# Patient Record
Sex: MALE | Race: WHITE | Employment: FULL TIME | URBAN - NONMETROPOLITAN AREA
[De-identification: names, ages, dates, MRNs, and addresses within clinical notes are randomized per-mention and may not be internally consistent; named-entity substitution may affect disease eponyms.]

---
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
## 2018-03-08 ENCOUNTER — DOCUMENTATION ONLY (OUTPATIENT)
Dept: FAMILY MEDICINE | Facility: OTHER | Age: 18
End: 2018-03-08

## 2018-03-08 RX ORDER — DIPHENOXYLATE HYDROCHLORIDE AND ATROPINE SULFATE 2.5; .025 MG/1; MG/1
1 TABLET ORAL DAILY
COMMUNITY
Start: 2014-09-09

## 2018-03-25 ENCOUNTER — HEALTH MAINTENANCE LETTER (OUTPATIENT)
Age: 18
End: 2018-03-25

## 2018-06-16 ENCOUNTER — HOSPITAL ENCOUNTER (OUTPATIENT)
Dept: GENERAL RADIOLOGY | Facility: OTHER | Age: 18
Discharge: HOME | End: 2018-06-16
Attending: PHYSICIAN ASSISTANT | Admitting: PHYSICIAN ASSISTANT
Payer: COMMERCIAL

## 2018-06-16 ENCOUNTER — OFFICE VISIT (OUTPATIENT)
Dept: FAMILY MEDICINE | Facility: OTHER | Age: 18
End: 2018-06-16
Attending: PHYSICIAN ASSISTANT
Payer: COMMERCIAL

## 2018-06-16 VITALS
DIASTOLIC BLOOD PRESSURE: 82 MMHG | HEART RATE: 69 BPM | BODY MASS INDEX: 23.43 KG/M2 | OXYGEN SATURATION: 98 % | WEIGHT: 173 LBS | SYSTOLIC BLOOD PRESSURE: 102 MMHG | HEIGHT: 72 IN

## 2018-06-16 DIAGNOSIS — S69.92XA THUMB INJURY, LEFT, INITIAL ENCOUNTER: ICD-10-CM

## 2018-06-16 DIAGNOSIS — S69.92XA THUMB INJURY, LEFT, INITIAL ENCOUNTER: Primary | ICD-10-CM

## 2018-06-16 PROCEDURE — 99213 OFFICE O/P EST LOW 20 MIN: CPT | Performed by: PHYSICIAN ASSISTANT

## 2018-06-16 PROCEDURE — 73140 X-RAY EXAM OF FINGER(S): CPT | Mod: LT

## 2018-06-16 NOTE — PATIENT INSTRUCTIONS
Home care  Follow these guidelines when caring for yourself at home:    Keep your arm elevated to reduce pain and swelling. When sitting or lying down elevate your arm above the level of your heart. You can do this by placing your arm on a pillow that rests on your chest or on a pillow at your side. This is most important during the first 2 days (48 hours) after the injury.    Put an ice pack on the injured area. Do this for 20 minutes every 1 to 2 hours the first day for pain relief. You can make an ice pack by wrapping a plastic bag of ice cubes in a thin towel. As the ice melts, be careful that the cast or splint doesn t get wet. Continue using the ice pack 3 to 4 times a day for the next 2 days. Then use the ice pack as needed to ease pain and swelling.    You may use acetaminophen or ibuprofen to control pain, unless another pain medicine was prescribed. If you have chronic liver or kidney disease, talk with your healthcare provider before using these medicines. Also talk with your provider if you ve had a stomach ulcer or gastrointestinal bleeding.  When to seek medical advice    Pain or swelling gets worse    Redness or warmth in the hand    Fingers or hand become cold, blue, numb, or tingly    Fever of 100.4 F (38 C) or higher, or as directed by your healthcare provider

## 2018-06-16 NOTE — NURSING NOTE
Patient present to clinic today with a injury to his left thumb. He smashed it with a hammer last Harsh 6/10/18.  Dyana Henson CMA..............6/16/2018........1:19 PM

## 2018-06-16 NOTE — MR AVS SNAPSHOT
After Visit Summary   6/16/2018    Agusto Beach    MRN: [id number]           Patient Information     Date Of Birth          2000        Visit Information        Provider Department      6/16/2018 12:45 PM Christy Bird PA-C [facility]        Today's Diagnoses     Thumb injury, left, initial encounter    -  1      Care Instructions      Home care  Follow these guidelines when caring for yourself at home:    Keep your arm elevated to reduce pain and swelling. When sitting or lying down elevate your arm above the level of your heart. You can do this by placing your arm on a pillow that rests on your chest or on a pillow at your side. This is most important during the first 2 days (48 hours) after the injury.    Put an ice pack on the injured area. Do this for 20 minutes every 1 to 2 hours the first day for pain relief. You can make an ice pack by wrapping a plastic bag of ice cubes in a thin towel. As the ice melts, be careful that the cast or splint doesn t get wet. Continue using the ice pack 3 to 4 times a day for the next 2 days. Then use the ice pack as needed to ease pain and swelling.    You may use acetaminophen or ibuprofen to control pain, unless another pain medicine was prescribed. If you have chronic liver or kidney disease, talk with your healthcare provider before using these medicines. Also talk with your provider if you ve had a stomach ulcer or gastrointestinal bleeding.  When to seek medical advice    Pain or swelling gets worse    Redness or warmth in the hand    Fingers or hand become cold, blue, numb, or tingly    Fever of 100.4 F (38 C) or higher, or as directed by your healthcare provider            Follow-ups after your visit        Future tests that were ordered for you today     Open Future Orders        Priority Expected Expires Ordered    XR Finger Left G/E 2 Views Routine 6/16/2018 6/16/2019 6/16/2018            Who to contact     If you have  "questions or need follow up information about today's clinic visit or your schedule please contact [facility] directly at [phone].  Normal or non-critical lab and imaging results will be communicated to you by Stroh[facility]art, letter or phone within 4 business days after the clinic has received the results. If you do not hear from us within 7 days, please contact the clinic through Strohohart or phone. If you have a critical or abnormal lab result, we will notify you by phone as soon as possible.  Submit refill requests through SendGrid or call your pharmacy and they will forward the refill request to us. Please allow 3 business days for your refill to be completed.          Additional Information About Your Visit        [facility] Information     SendGrid lets you send messages to your doctor, view your test results, renew your prescriptions, schedule appointments and more. To sign up, go to [email or web address] . Click on \"Log in\" on the left side of the screen, which will take you to the Welcome page. Then click on \"Sign up Now\" on the right side of the page.     You will be asked to enter the access code listed below, as well as some personal information. Please follow the directions to create your username and password.     Your access code is: [email or web address]  Expires: 2018  2:07 PM     Your access code will  in 90 days. If you need help or a new code, please call your [facility] clinic or [phone].        Care EveryWhere ID     This is your Care EveryWhere ID. This could be used by other organizations to access your [facility] medical records  [id number]        Your Vitals Were     Pulse Height Pulse Oximetry BMI (Body Mass Index)          69 6' (1.829 m) 98% 23.46 kg/m2         Blood Pressure from Last 3 Encounters:   18 102/82   11/10/15 90/64   06/25/15 116/74    Weight from Last 3 Encounters:   18 173 lb (78.5 kg) (75 %)*   11/10/15 151 lb 3.2 oz (68.6 kg) (63 %)* "   06/25/15 150 lb 12.8 oz (68.4 kg) (67 %)*     * Growth percentiles are based on CDC 2-20 Years data.               Primary Care Provider Office Phone # Fax #    Prosper Fine [phone]       [address]  [place] [address]        Equal Access to Services     [facility] HospitalANGÉLICA : Hadii aad ku hadasho Soomaali, waaxda luqadaha, qaybta kaalmada adeegyada, waxay louisain hayaan ademaryann khjacquelinerob lacecily . So [facility] [phone].    ATENCIÓN: Si habla español, tiene a moser disposición servicios gratuitos de asistencia lingüística. Llame al [phone].    We comply with applicable federal civil rights laws and [place] laws. We do not discriminate on the basis of race, color, national origin, age, disability, sex, sexual orientation, or gender identity.            Thank you!     Thank you for choosing [facility]  for your care. Our goal is always to provide you with excellent care. Hearing back from our patients is one way we can continue to improve our services. Please take a few minutes to complete the written survey that you may receive in the mail after your visit with us. Thank you!             Your Updated Medication List - Protect others around you: Learn how to safely use, store and throw away your medicines at www.disposemymeds.org.          This list is accurate as of 6/16/18  2:07 PM.  Always use your most recent med list.                   Brand Name Dispense Instructions for use Diagnosis    MULTI-VITAMINS Tabs      Take 1 tablet by mouth daily

## 2018-06-17 NOTE — PROGRESS NOTES
SUBJECTIVE:   Agusto Beach is a 17 year old male presenting with a chief complaint of   Chief Complaint   Patient presents with     Thumb Discomfort     injury - left     Nursing Notes:   Dyana Henson CMA  6/16/2018  2:05 PM  Signed  Patient present to clinic today with a injury to his left thumb. He smashed it with a hammer last Harsh 6/10/18.  Dyana Henson CMA..............6/16/2018........1:19 PM    HPI:  Agusto is presenting today with left thumb pain.  He reports smashing his left thumb with a hammer 6 days ago.  He had immediate swelling and bruising, worse for the first couple of days.  He was not seen for this problem at the time.  However, as the days goes go on and he still having difficulty moving his thumb at the IP joint, he decides to come in today for an x-ray.  He reports the swelling and bruising have improved.  He has been wearing a makeshift splint and that is helpful for pain.  He also has been using ibuprofen and ice as needed.  He has small amount of bruising to the thumbnail.    Review of Systems  He is otherwise feeling fine.  Denies any fever chills, cold symptoms, cough, chest pain, shortness of breath, abdominal pain, nausea, vomiting, diarrhea.  Past Medical History:   Diagnosis Date     Condition influencing health status     No Comments Provided   He has no chronic medical problems.  History reviewed. No pertinent family history.  Current Outpatient Prescriptions   Medication Sig Dispense Refill     Multiple Vitamin (MULTI-VITAMINS) TABS Take 1 tablet by mouth daily       Social History   Substance Use Topics     Smoking status: Never Smoker     Smokeless tobacco: Never Used     Alcohol use No   He is not a smoker.  He is in the [employer].    OBJECTIVE  /82  Pulse 69  Ht 6' (1.829 m)  Wt 173 lb (78.5 kg)  SpO2 98%  BMI 23.46 kg/m2    Physical Exam  Alert pleasant male in no acute distress.  Heart regular rate and rhythm without murmur.  Lungs clear to auscultation.   Left thumb with mild swelling at the IP joint, resolving ecchymosis.  He has a small 2 mm area of ecchymosis at the medial proximal nail bed.  Good capillary refill.  He has normal range of motion at the IP joint, pain with palpation here, especially medially.  Normal radial pulse.  Labs:  No results found for this or any previous visit (from the past 24 hour(s)).  X-ray left thumb with soft tissue swelling, otherwise normal      ASSESSMENT:      ICD-10-CM    1. Thumb injury, left, initial encounter S69.92XA XR Finger Left G/E 2 Views        PLAN:  Thumb contusion from the hammer injury.  Offered formal splinting, patient declines and will continue to use his homemade splint, which is quite adequate.  He is to use ice and ibuprofen as needed.  This should gradually improve over time.  If not, he will be reevaluated.    Christy Bird PA-C on 6/17/2018 at 4:41 PM          Patient Instructions     Home care  Follow these guidelines when caring for yourself at home:    Keep your arm elevated to reduce pain and swelling. When sitting or lying down elevate your arm above the level of your heart. You can do this by placing your arm on a pillow that rests on your chest or on a pillow at your side. This is most important during the first 2 days (48 hours) after the injury.    Put an ice pack on the injured area. Do this for 20 minutes every 1 to 2 hours the first day for pain relief. You can make an ice pack by wrapping a plastic bag of ice cubes in a thin towel. As the ice melts, be careful that the cast or splint doesn t get wet. Continue using the ice pack 3 to 4 times a day for the next 2 days. Then use the ice pack as needed to ease pain and swelling.    You may use acetaminophen or ibuprofen to control pain, unless another pain medicine was prescribed. If you have chronic liver or kidney disease, talk with your healthcare provider before using these medicines. Also talk with your provider if you ve had a stomach  ulcer or gastrointestinal bleeding.  When to seek medical advice    Pain or swelling gets worse    Redness or warmth in the hand    Fingers or hand become cold, blue, numb, or tingly    Fever of 100.4 F (38 C) or higher, or as directed by your healthcare provider

## 2019-05-22 ENCOUNTER — OFFICE VISIT (OUTPATIENT)
Dept: FAMILY MEDICINE | Facility: OTHER | Age: 19
End: 2019-05-22
Attending: PHYSICIAN ASSISTANT
Payer: COMMERCIAL

## 2019-05-22 VITALS
BODY MASS INDEX: 22.63 KG/M2 | TEMPERATURE: 98.9 F | HEART RATE: 72 BPM | WEIGHT: 167.1 LBS | HEIGHT: 72 IN | DIASTOLIC BLOOD PRESSURE: 72 MMHG | SYSTOLIC BLOOD PRESSURE: 116 MMHG | RESPIRATION RATE: 18 BRPM

## 2019-05-22 DIAGNOSIS — H65.90 FLUID COLLECTION OF MIDDLE EAR: Primary | ICD-10-CM

## 2019-05-22 PROCEDURE — 99213 OFFICE O/P EST LOW 20 MIN: CPT | Performed by: PHYSICIAN ASSISTANT

## 2019-05-22 ASSESSMENT — MIFFLIN-ST. JEOR: SCORE: 1805.96

## 2019-05-22 ASSESSMENT — PAIN SCALES - GENERAL: PAINLEVEL: MILD PAIN (3)

## 2019-05-22 NOTE — PATIENT INSTRUCTIONS
Ear ache with out infection see AVS  Ibuprofen or tylenol as needed  OTC daily antihistamine recommended  OTC Flonase, take as directed  Return to clinic for persistence or worsening   Seek immediate care ofr    Your child has a fever (see Fever and children, below)    Ear pain that gets worse    Discharge, blood, or foul odor from ear    Unusual decreased activity, fussiness, drowsiness, or confusion    Headache, neck pain, or stiff neck    New rash    Frequent diarrhea or vomiting    Fluid or blood draining from the ear    Convulsion (seizure)     Patient Education     Earache, No Infection (Adult)  Earaches can happen without an infection. This occurs when air and fluid build up behind the eardrum causing a feeling of fullness and discomfort and reduced hearing. This is called otitis media with effusion (OME) or serous otitis media. It means there is fluid in the middle ear. It is not the same as acute otitis media, which is typically from infection.  OME can happen when you have a cold if congestion blocks the passage that drains the middle ear. This passage is called the eustachian tube. OME may also occur with nasal allergies or after a bacterial middle ear infection.    The pain or discomfort may come and go. You may hear clicking or popping sounds when you chew or swallow. You may feel that your balance is off. Or you may hear ringing in the ear.  It often takes from several weeks up to 3 months for the fluid to clear on its own. Oral pain relievers and ear drops help if there is pain. Decongestants and antihistamines sometimes help. Antibiotics don't help since there is no infection. Your doctor may prescribe a nasal spray to help reduce swelling in the nose and eustachian tube. This can allow the ear to drain.  If your OME doesn't improve after 3 months, surgery may be used to drain the fluid and insert a small tube in the eardrum to allow continued drainage.  Because the middle ear fluid can become  infected, it is important to watch for signs of an ear infection which may develop later. These signs include increased ear pain, fever, or drainage from the ear.  Home care  The following guidelines will help you care for yourself at home:    You may use over-the-counter medicine as directed to control pain, unless another medicine was prescribed. If you have chronic liver or kidney disease or ever had a stomach ulcer or GI bleeding, talk with your doctor before using these medicines. Aspirin should never be used in anyone under 18 years of age who is ill with a fever. It may cause severe liver damage.    You may use over-the-counter decongestants such as phenylephrine or pseudoephedrine. But they are not always helpful. Don't use nasal spray decongestants more than 3 days. Longer use can make congestion worse. Prescription nasal sprays from your doctor don't typically have those restrictions.    Antihistamines may help if you are also having allergy symptoms.    You may use medicines such as guaifenesin to thin mucus and promote drainage.  Follow-up care  Follow up with your healthcare provider or as advised if you are not feeling better after 3 days.  When to seek medical advice  Call your healthcare provider right away if any of the following occur:    Your ear pain gets worse or does not start to improve     Fever of 100.4 F (38 C) or higher, or as directed by your healthcare provider    Fluid or blood draining from the ear    Headache or sinus pain    Stiff neck    Unusual drowsiness or confusion  Date Last Reviewed: 10/1/2016    6616-8763 [employer]. [address] [place] [address], [place] [address]. All rights reserved. This information is not intended as a substitute for professional medical care. Always follow your healthcare professional's instructions.

## 2019-05-22 NOTE — PROGRESS NOTES
SUBJECTIVE:  Agusto Beach is a 18 year old male presents to [facility] for evaluation of left ear plugged sensation.   Onset 5 days ago, course is unchanged  Associated symptoms: mild allergy symptoms    OM history:  infrequent  Water exposures - none  Treatments: applied oil and and warm water flush    Past Medical History:   Diagnosis Date     Condition influencing health status     No Comments Provided     Current Outpatient Medications   Medication     Multiple Vitamin (MULTI-VITAMINS) TABS     No current facility-administered medications for this visit.         Allergies   Allergen Reactions     Citrullus Vulgaris      Seasonal        ROS  General: feels well, no fever  HENT: left ear is plugged  Respiratory negative  Abdomen - negative  Skin - negative    OBJECTIVE:  Vitals:    05/22/19 1716   BP: 116/72   BP Location: Right arm   Patient Position: Sitting   Cuff Size: Adult Regular   Pulse: 72   Resp: 18   Temp: 98.9  F (37.2  C)   TempSrc: Tympanic   Weight: 75.8 kg (167 lb 1.6 oz)   Height: 1.829 m (6')     General appearance: healthy, alert and NAD.    Eye: no injection or drainage  Ears: abnormal: mild clear effusion bilaterally, canals are clear, non tender  Nose: purulent rhinorrhea and mucosal erythema  Oropharynx: mild erythema  Neck: normal, supple and no adenopathy  Cardiac: normal RR, no murmur  Lungs: normal respiration, clear to ausculation  Skin: no rash    ASSESSMENT:  (H65.90) Fluid collection of middle ear  (primary encounter diagnosis)    Plan:   Muffled hearing, no wax in canal, no infection  Fluid in middle ear likely related to allergies  Ibuprofen or tylenol as needed  OTC daily antihistamine recommended  OTC Flonase, take as directed  Return to clinic for persistence or worsening   Patient received verbal and written instruction including review of warning signs    Verena Ernst PA-C on 5/22/2019 at 5:57 PM

## 2019-05-22 NOTE — NURSING NOTE
Patient presents to the clinic for left ear pain that started last Saturday. He reports the ear feeling plugged and has tried flushing his ear for treatment.  Medication Reconciliation: complete    Carlota Reynaga, CMA

## 2019-06-24 ENCOUNTER — OFFICE VISIT (OUTPATIENT)
Dept: FAMILY MEDICINE | Facility: OTHER | Age: 19
End: 2019-06-24
Attending: NURSE PRACTITIONER
Payer: COMMERCIAL

## 2019-06-24 VITALS
RESPIRATION RATE: 18 BRPM | SYSTOLIC BLOOD PRESSURE: 120 MMHG | HEART RATE: 84 BPM | HEIGHT: 72 IN | WEIGHT: 166.7 LBS | TEMPERATURE: 98.6 F | BODY MASS INDEX: 22.58 KG/M2 | DIASTOLIC BLOOD PRESSURE: 76 MMHG

## 2019-06-24 DIAGNOSIS — R39.89 URINARY PROBLEM: ICD-10-CM

## 2019-06-24 DIAGNOSIS — Z71.1 CONCERN ABOUT MALE GENITAL DISEASE WITHOUT DIAGNOSIS: Primary | ICD-10-CM

## 2019-06-24 LAB
ALBUMIN UR-MCNC: NEGATIVE MG/DL
APPEARANCE UR: CLEAR
BILIRUB UR QL STRIP: NEGATIVE
C TRACH DNA SPEC QL PROBE+SIG AMP: NOT DETECTED
COLOR UR AUTO: YELLOW
GLUCOSE UR STRIP-MCNC: NEGATIVE MG/DL
HGB UR QL STRIP: NEGATIVE
KETONES UR STRIP-MCNC: NEGATIVE MG/DL
LEUKOCYTE ESTERASE UR QL STRIP: NEGATIVE
N GONORRHOEA DNA SPEC QL PROBE+SIG AMP: NOT DETECTED
NITRATE UR QL: NEGATIVE
PH UR STRIP: 5.5 PH (ref 5–9)
SOURCE: NORMAL
SP GR UR STRIP: <1.005 (ref 1–1.03)
SPECIMEN SOURCE: NORMAL
UROBILINOGEN UR STRIP-ACNC: 0.2 EU/DL (ref 0.2–1)

## 2019-06-24 PROCEDURE — 87491 CHLMYD TRACH DNA AMP PROBE: CPT | Mod: ZL | Performed by: NURSE PRACTITIONER

## 2019-06-24 PROCEDURE — 81003 URINALYSIS AUTO W/O SCOPE: CPT | Mod: ZL | Performed by: NURSE PRACTITIONER

## 2019-06-24 PROCEDURE — 99213 OFFICE O/P EST LOW 20 MIN: CPT | Performed by: NURSE PRACTITIONER

## 2019-06-24 PROCEDURE — 87591 N.GONORRHOEAE DNA AMP PROB: CPT | Mod: ZL | Performed by: NURSE PRACTITIONER

## 2019-06-24 ASSESSMENT — PAIN SCALES - GENERAL: PAINLEVEL: MODERATE PAIN (4)

## 2019-06-24 ASSESSMENT — MIFFLIN-ST. JEOR: SCORE: 1804.15

## 2019-06-24 NOTE — PROGRESS NOTES
Nursing Notes:   Carlota Reynaga CMA  6/24/2019  5:16 PM  Sign at exiting of workspace  Patient presents to the clinic for pelvic cramping/pressure, yellow discharge and testicle pain that started Thursday. Patient reports no known injury or STD concerns.  Medication Reconciliation: complete    Carlota Reynaga CMA      SUBJECTIVE:   Agusto Beach is a 18 year old male who presents to clinic today for the following health issues:    Here with concerns for a UTI. He noted since Thursday he has had some yellow in his ejaculate. He also notes some mild pelvic cramping, pressure as well. No fevers, chills. Last sex partner was 8 months ago. He also noted some testicular pain behind the testes and into the scrotum.     He has not ever had this occur in the past. No actual penile discharge, no dysuria, no urinary frequency.        Problem list and histories reviewed & adjusted, as indicated.  Additional history: as documented    Patient Active Problem List   Diagnosis     Injury of left thumb     Past Surgical History:   Procedure Laterality Date     OTHER SURGICAL HISTORY      [id number],NO PREVIOUS SURGERY       Social History     Tobacco Use     Smoking status: Never Smoker     Smokeless tobacco: Never Used   Substance Use Topics     Alcohol use: No     Alcohol/week: 0.0 oz     History reviewed. No pertinent family history.      Current Outpatient Medications   Medication Sig Dispense Refill     Multiple Vitamin (MULTI-VITAMINS) TABS Take 1 tablet by mouth daily       Allergies   Allergen Reactions     Citrullus Vulgaris      Seasonal          ROS:  Notable findings in the HPI.       OBJECTIVE:     /76 (BP Location: Right arm, Patient Position: Sitting, Cuff Size: Adult Regular)   Pulse 84   Temp 98.6  F (37  C) (Tympanic)   Resp 18   Ht 1.829 m (6')   Wt 75.6 kg (166 lb 11.2 oz)   BMI 22.61 kg/m    Body mass index is 22.61 kg/m .  GENERAL: healthy, alert and no distress  EYES: Eyes grossly normal to  inspection  HENT: normal cephalic/atraumatic and oral mucous membranes moist  RESP: without increased work of breathing.   CV: regular rates and rhythm and no peripheral edema  SKIN: no suspicious lesions or rashes  NEURO: Normal strength and tone, mentation intact and speech normal    Diagnostic Test Results:  Results for orders placed or performed in visit on 06/24/19 (from the past 24 hour(s))   Urinalysis w Reflex Microscopic If Positive   Result Value Ref Range    Color Urine Yellow     Appearance Urine Clear     Glucose Urine Negative NEG^Negative mg/dL    Bilirubin Urine Negative NEG^Negative    Ketones Urine Negative NEG^Negative mg/dL    Specific Gravity Urine <1.005 1.000 - 1.030    Blood Urine Negative NEG^Negative    pH Urine 5.5 5.0 - 9.0 pH    Protein Albumin Urine Negative NEG^Negative mg/dL    Urobilinogen Urine 0.2 0.2 - 1.0 EU/dL    Nitrite Urine Negative NEG^Negative    Leukocyte Esterase Urine Negative NEG^Negative    Source Midstream Urine        ASSESSMENT/PLAN:     1. Concern about male genital disease without diagnosis  Will call with Lab results.     2. Urinary problem  - Urinalysis w Reflex Microscopic If Positive  - GC/Chlamydia by PCR      PLAN:    Will call with lab results tonight. A yellow coloration is ok in ejaculate.   Watchful waiting and f/u if needed.       Followup:    If not improving or if condition worsens, follow up with your Primary Care Provider    I explained my diagnostic considerations and recommendations to the patient, who voiced understanding and agreement with the treatment plan. All questions were answered. We discussed potential side effects of any prescribed or recommended therapies, as well as expectations for response to treatments. He was advised to contact our office if there is no improvement or worsening of conditions or symptoms.  If s/s worsen or persist, patient will either come back or follow up with PCP.    Disclaimer:  This note consists of words and  symbols derived from keyboarding, dictation, or using voice recognition software. As a result, there may be errors in the script that have gone undetected. Please consider this when interpreting information found in this note.      Yareli Gonsalez NP, 6/24/2019 5:26 PM

## 2019-06-24 NOTE — NURSING NOTE
Patient presents to the clinic for pelvic cramping/pressure, yellow discharge and testicle pain that started Thursday. Patient reports no known injury or STD concerns.  Medication Reconciliation: complete    Carlota Reynaga, CMA

## 2020-09-06 ENCOUNTER — ALLIED HEALTH/NURSE VISIT (OUTPATIENT)
Dept: FAMILY MEDICINE | Facility: OTHER | Age: 20
End: 2020-09-06
Payer: OTHER GOVERNMENT

## 2020-09-06 DIAGNOSIS — R05.9 COUGH: Primary | ICD-10-CM

## 2020-09-06 PROCEDURE — 99207 ZZC NO CHARGE NURSE ONLY: CPT

## 2020-09-06 PROCEDURE — C9803 HOPD COVID-19 SPEC COLLECT: HCPCS

## 2020-09-06 PROCEDURE — U0003 INFECTIOUS AGENT DETECTION BY NUCLEIC ACID (DNA OR RNA); SEVERE ACUTE RESPIRATORY SYNDROME CORONAVIRUS 2 (SARS-COV-2) (CORONAVIRUS DISEASE [COVID-19]), AMPLIFIED PROBE TECHNIQUE, MAKING USE OF HIGH THROUGHPUT TECHNOLOGIES AS DESCRIBED BY CMS-2020-01-R: HCPCS | Mod: ZL

## 2020-09-07 LAB
SARS-COV-2 RNA SPEC QL NAA+PROBE: NOT DETECTED
SPECIMEN SOURCE: NORMAL

## 2021-01-03 ENCOUNTER — HEALTH MAINTENANCE LETTER (OUTPATIENT)
Age: 21
End: 2021-01-03

## 2021-03-16 ENCOUNTER — VIRTUAL VISIT (OUTPATIENT)
Dept: FAMILY MEDICINE | Facility: OTHER | Age: 21
End: 2021-03-16
Attending: FAMILY MEDICINE
Payer: COMMERCIAL

## 2021-03-16 DIAGNOSIS — Z20.822 EXPOSURE TO COVID-19 VIRUS: Primary | ICD-10-CM

## 2021-03-16 PROCEDURE — 99212 OFFICE O/P EST SF 10 MIN: CPT | Mod: 95 | Performed by: NURSE PRACTITIONER

## 2021-03-16 NOTE — NURSING NOTE
Chief Complaint   Patient presents with     Covid Concern     Parents have had symptoms for over a week, got tested Sunday and came back positive yesterday. He needs to be tested for work.     Initial There were no vitals taken for this visit. Estimated body mass index is 22.61 kg/m  as calculated from the following:    Height as of 6/24/19: 1.829 m (6').    Weight as of 6/24/19: 75.6 kg (166 lb 11.2 oz).         Medication Reconciliation: Complete      Hailey Butts

## 2021-03-16 NOTE — PROGRESS NOTES
Agusto is a 20 year old who is being evaluated via a billable telephone visit.      What phone number would you like to be contacted at? [phone]  How would you like to obtain your AVS? MyChart    Assessment & Plan     Exposure to COVID-19 virus  Exposure to COVID-19 via his parents with whom he lives.  COVID-19 testing is ordered and he will schedule this at his convenience.  - Asymptomatic COVID-19 Virus (Coronavirus) by PCR; Future      No follow-ups on file.    MIRTHA Villatoro [facility]    Subjective   Agusto is a 20 year old who presents via telephone visit for exposure to COVID-19.    HPI     He reports he lives with his parents currently.  They have had symptoms for about a week and a half.  On Sunday they were tested and notify that they were positive for COVID-19.  He states he has not had any symptoms.  He did have his first [employer] COVID-19 vaccine approximately 2 weeks ago.  He has never had Covid that he is aware of.  Currently working as a  and needs to have testing done.    Review of Systems   See above      Objective    Vitals - Patient Reported  Weight (Patient Reported): 74.8 kg (165 lb)  Height (Patient Reported): 182.9 cm (6')  BMI (Based on Pt Reported Ht/Wt): 22.38  Pain Score: No Pain (0)        Physical Exam   healthy, alert and no distress  PSYCH: Alert and oriented times 3; coherent speech, normal   rate and volume, able to articulate logical thoughts, able   to abstract reason, no tangential thoughts, no hallucinations   or delusions  His affect is normal  RESP: No cough, no audible wheezing, able to talk in full sentences  Remainder of exam unable to be completed due to telephone visits                Phone call duration: 5 minutes

## 2021-03-17 ENCOUNTER — ALLIED HEALTH/NURSE VISIT (OUTPATIENT)
Dept: FAMILY MEDICINE | Facility: OTHER | Age: 21
End: 2021-03-17
Attending: NURSE PRACTITIONER
Payer: COMMERCIAL

## 2021-03-17 DIAGNOSIS — Z20.822 EXPOSURE TO COVID-19 VIRUS: ICD-10-CM

## 2021-03-17 LAB
SARS-COV-2 RNA RESP QL NAA+PROBE: NORMAL
SPECIMEN SOURCE: NORMAL

## 2021-03-17 PROCEDURE — U0005 INFEC AGEN DETEC AMPLI PROBE: HCPCS | Mod: ZL | Performed by: NURSE PRACTITIONER

## 2021-03-17 PROCEDURE — C9803 HOPD COVID-19 SPEC COLLECT: HCPCS

## 2021-03-17 PROCEDURE — U0003 INFECTIOUS AGENT DETECTION BY NUCLEIC ACID (DNA OR RNA); SEVERE ACUTE RESPIRATORY SYNDROME CORONAVIRUS 2 (SARS-COV-2) (CORONAVIRUS DISEASE [COVID-19]), AMPLIFIED PROBE TECHNIQUE, MAKING USE OF HIGH THROUGHPUT TECHNOLOGIES AS DESCRIBED BY CMS-2020-01-R: HCPCS | Mod: ZL | Performed by: NURSE PRACTITIONER

## 2021-03-18 LAB
LABORATORY COMMENT REPORT: NORMAL
SARS-COV-2 RNA RESP QL NAA+PROBE: NEGATIVE
SPECIMEN SOURCE: NORMAL

## 2021-10-09 ENCOUNTER — HEALTH MAINTENANCE LETTER (OUTPATIENT)
Age: 21
End: 2021-10-09

## 2022-01-29 ENCOUNTER — HEALTH MAINTENANCE LETTER (OUTPATIENT)
Age: 22
End: 2022-01-29

## 2022-09-17 ENCOUNTER — HEALTH MAINTENANCE LETTER (OUTPATIENT)
Age: 22
End: 2022-09-17

## 2023-05-06 ENCOUNTER — HEALTH MAINTENANCE LETTER (OUTPATIENT)
Age: 23
End: 2023-05-06